# Patient Record
Sex: MALE | Race: WHITE
[De-identification: names, ages, dates, MRNs, and addresses within clinical notes are randomized per-mention and may not be internally consistent; named-entity substitution may affect disease eponyms.]

---

## 2018-08-14 ENCOUNTER — HOSPITAL ENCOUNTER (EMERGENCY)
Dept: HOSPITAL 68 - ERH | Age: 28
End: 2018-08-14
Payer: COMMERCIAL

## 2018-08-14 VITALS — SYSTOLIC BLOOD PRESSURE: 128 MMHG | DIASTOLIC BLOOD PRESSURE: 71 MMHG

## 2018-08-14 VITALS — BODY MASS INDEX: 21.62 KG/M2 | WEIGHT: 151 LBS | HEIGHT: 70 IN

## 2018-08-14 DIAGNOSIS — N50.812: Primary | ICD-10-CM

## 2018-08-14 DIAGNOSIS — N50.811: ICD-10-CM

## 2018-08-14 LAB
ABSOLUTE GRANULOCYTE CT: 2.9 /CUMM (ref 1.4–6.5)
BASOPHILS # BLD: 0.1 /CUMM (ref 0–0.2)
BASOPHILS NFR BLD: 1.2 % (ref 0–2)
EOSINOPHIL # BLD: 0.1 /CUMM (ref 0–0.7)
EOSINOPHIL NFR BLD: 1.9 % (ref 0–5)
ERYTHROCYTE [DISTWIDTH] IN BLOOD BY AUTOMATED COUNT: 12.6 % (ref 11.5–14.5)
GRANULOCYTES NFR BLD: 60.2 % (ref 42.2–75.2)
HCT VFR BLD CALC: 43.9 % (ref 42–52)
LYMPHOCYTES # BLD: 1.4 /CUMM (ref 1.2–3.4)
MCH RBC QN AUTO: 29 PG (ref 27–31)
MCHC RBC AUTO-ENTMCNC: 33.9 G/DL (ref 33–37)
MCV RBC AUTO: 85.5 FL (ref 80–94)
MONOCYTES # BLD: 0.4 /CUMM (ref 0.1–0.6)
PLATELET # BLD: 177 /CUMM (ref 130–400)
PMV BLD AUTO: 8.4 FL (ref 7.4–10.4)
RED BLOOD CELL CT: 5.13 /CUMM (ref 4.7–6.1)
WBC # BLD AUTO: 4.8 /CUMM (ref 4.8–10.8)

## 2018-08-14 NOTE — ED GENERAL ADULT
History of Present Illness
 
General
Chief Complaint: Male Genitourinary Problems
Stated Complaint: SENT BY URGENT CARE FOR TESTICULAR PAIN
Source: patient
Exam Limitations: no limitations
 
Vital Signs & Intake/Output
Vital Signs & Intake/Output
 Vital Signs
 
 
Date Time Temp Pulse Resp B/P B/P Pulse O2 O2 Flow FiO2
 
     Mean Ox Delivery Rate 
 
08/14 2200  70 20 128/71  97   
 
08/14 1917 97.1 74 18 136/90  97 Room Air  
 
 
 
Allergies
Coded Allergies:
No Known Allergies (08/14/18)
 
Reconcile Medications
No Known Home Medications
 
Triage Note:
PT STATES THAT HE WAS WORKING OUT LAST NIGHT AND
EVERY SINCE HE HAS BEEN HAVING TESTICULAR PAIN ,
STATES THAT IT IS LIKE " THERE IN A VICE "
DENIES URINARY SYMPTOMS. PT WAS EVALUATED AT
URGENT CARE IN [place] AND SENT TO ER
Triage Nurses Notes Reviewed? yes
Onset: Gradual
Duration: months
Timing: constant
HPI:
28-year-old otherwise healthy male presenting with bilateral testicular pain 4 
months.  States that the pain is constant every day, but sometimes will be worse
than others.  Has noticed the pain is sometimes worse with movement.  Was at the
gym last night and had acute worsening of his testicles, states that it felt 
like they were being squeezed inside of a vice.  Was evaluated at an urgent care
earlier today who sent him in for rule out torsion.  Denies fevers, abdominal 
pain, dysuria, penile discharge, scrotal swelling.  Occasionally tries ibuprofen
for pain, but states that he does not like to take medications.  Denies genital 
trauma.
(Agnes Constantino)
 
Past History
 
Travel History
Traveled to [place] past 21 day No
 
Medical History
Any Pertinent Medical History? none
Neurological: NONE
EENT: NONE
Cardiovascular: NONE
Respiratory: NONE
Gastrointestinal: NONE
Hepatic: NONE
Renal: NONE
Musculoskeletal: NONE
Psychiatric: NONE
Endocrine: NONE
Blood Disorders: NONE
Cancer(s): NONE
GYN/Reproductive: NONE
 
Surgical History
Surgical History: non-contributory
 
Psychosocial History
What is your primary language English
Tobacco Use: Never used
ETOH Use: denies use
Illicit Drug Use: denies illicit drug use
 
Family History
Hx Contributory? No
(Agnes Constantino)
 
Review of Systems
 
Review of Systems
Constitutional:
Reports: no symptoms. 
EENTM:
Reports: no symptoms. 
Respiratory:
Reports: no symptoms. 
Cardiovascular:
Reports: no symptoms. 
GI:
Reports: no symptoms. 
Genitourinary:
Reports: see HPI. 
Musculoskeletal:
Reports: no symptoms. 
Skin:
Reports: no symptoms. 
Neurological/Psychological:
Reports: no symptoms. 
Hematologic/Endocrine:
Reports: no symptoms. 
Immunologic/Allergic:
Reports: no symptoms. 
All Other Systems: Reviewed and Negative
(Agnes Constantino)
 
Physical Exam
 
Physical Exam
General Appearance: well developed/nourished, no apparent distress, alert, awake
, comfortable
Comments:
Gen.: Well-nourished, well-developed, no acute distress.
Head: Normocephalic, atraumatic.
Eyes: Normal inspection bilaterally
Ears: Normal inspection bilaterally
Nose: Normal inspection
Neck: Normal inspection
Lungs: clear to auscultation bilaterally, normnal breath sounds
Heart: regular rate and rhythm
Abdomen: soft and non-tender
Genitals: No penile discharge, no genital lesions, no scrotal swelling, no 
testicular tenderness to palpation, no inguinal lymphadenopathy
Extremities: Normal inspection
Neurologic: alert and oriented x3, steady gait
Skin: warm and dry
Psychiatric: Normal mood and affect, no apparent delusions or hallucinations, 
behavior appropriate
 
Core Measures
ACS in differential dx? No
CVA/TIA Diagnosis: No
Sepsis Present: No
Sepsis Focused Exam Completed? No
(Agnes Constantino)
 
Progress
Differential Diagnoses
I considered the following diagnoses in my evaluation of the patient: [
Testicular torsion versus epididymitis versus urethritis versus hernia versus 
UTI]
 
Plan of Care:
 Orders
 
 
Procedure Date/time Status
 
URINALYSIS 08/14 1918 Complete
 
CBC WITHOUT DIFFERENTIAL 08/14 1918 Complete
 
BASIC METABOLIC PANEL 08/14 1918 Complete
 
 
 Laboratory Tests
 
 
 
08/14/18 2040:
Urine Color YEL, Urine Clarity CLEAR, Urine pH 7.0, Ur Specific Gravity 1.010, 
Urine Protein NEG, Urine Ketones NEG, Urine Nitrite NEG, Urine Bilirubin NEG, 
Urine Urobilinogen 0.2, Ur Leukocyte Esterase NEG, Ur Microscopic EXAM NOT 
REQUIRED, Urine Hemoglobin NEG, Urine Glucose NEG
 
08/14/18 2000:
Anion Gap 9, Estimated GFR > 60, BUN/Creatinine Ratio 17.0, Glucose 93, Calcium 
9.7, CBC w Diff NO MAN DIFF REQ, RBC 5.13, MCV 85.5, MCH 29.0, MCHC 33.9, RDW 
12.6, MPV 8.4, Gran % 60.2, Lymphocytes % 29.3, Monocytes % 7.4, Eosinophils % 
1.9, Basophils % 1.2, Absolute Granulocytes 2.9, Absolute Lymphocytes 1.4, 
Absolute Monocytes 0.4, Absolute Eosinophils 0.1, Absolute Basophils 0.1
 
US impression
Normal testes.
2 mm left epididymal head cyst.
 
Patient informed of the above incidental findings
 
Labs unremarkable, including normal UA
 
Patient states that he had STD testing 8 months ago, was negative at that time, 
and has had no sexual encounters since.  Therefore STD testing was declined as 
there is low concern at this time.
 
No clear etiology for patient's symptoms, but low concern for emergent 
pathology.  All testing was unremarkable and exam is benign.  Given urology 
follow-up for further evaluation and given strict return precautions.  Offered 
Rx pain medication, but declining at this time.
Initial ED EKG: none
(Agnes Constantino)
 
Departure
 
Departure
Disposition: HOME OR SELF CARE
Condition: Stable
Clinical Impression
Primary Impression: Testicular pain
Referrals:
Gildardo DORSEY,Db Harrington MD,Piero (PCP/Family)
 
Additional Instructions:
Follow-up with urology for reevaluation.  Return to the emergency department for
any new or worsening symptoms.
Departure Forms:
Customer Survey
General Discharge Information
Prescriptions:
Current Visit Scripts
No Known Home Medications     
 
(Agnes Constantino)
 
PA/NP Co-Sign Statement
Statement:
ED Attending supervision documentation-
 
[] I saw and evaluated the patient. I have also reviewed all the pertinent lab 
results and diagnostic results. I agree with the findings and the plan of care 
as documented in the PA's/NP's documentation. 
 
[X] I have reviewed the ED Record and agree with the PA's/NP's documentation.
 
[] Additions or exceptions (if any) to the PAs/NP's note and plan are 
summarized below:
[]
 
(Jacoby Cortez DO)
 
Critical Care Note
 
Critical Care Note
Critical Care Time: non-applicable
(Agnes Constantino)

## 2018-08-14 NOTE — ULTRASOUND REPORT
EXAMINATION:
US SCROTUM
 
CLINICAL INFORMATION:
Testicular pain.
 
COMPARISON:
None
 
TECHNIQUE:
A sonogram of the scrotum was performed assessing gray-scale appearance and
color Doppler flow. Spectral analysis and Doppler interrogation was
performed.
 
FINDINGS:
 
RIGHT: Right testicle measures 4.6 x 2.8 x 3.1 cm, volume 20.9 mL.
Parenchymal echotexture is normal. No focal testicular parenchymal lesions
are visualized. Normal symmetric intratesticular flow is visualized.
 
Right epididymal head is normal in size. No right hydrocele or varicocele is
seen.
 
LEFT: Left testicle measures 5.2 x 2.3 x 2.9 cm, volume 24.6 mL. Parenchymal
echotexture is normal. No focal testicular parenchymal lesions are
visualized. Normal symmetric intratesticular flow is visualized.
 
Left epididymal head is normal in size. There is a 2 mm left epididymal head
cyst. No left hydrocele or varicocele is seen.
 
IMPRESSION:
 
Normal testes.
 
2 mm left epididymal head cyst.